# Patient Record
Sex: MALE | Race: WHITE | NOT HISPANIC OR LATINO | Employment: PART TIME | URBAN - METROPOLITAN AREA
[De-identification: names, ages, dates, MRNs, and addresses within clinical notes are randomized per-mention and may not be internally consistent; named-entity substitution may affect disease eponyms.]

---

## 2019-08-02 ENCOUNTER — HOSPITAL ENCOUNTER (EMERGENCY)
Facility: MEDICAL CENTER | Age: 24
End: 2019-08-03
Attending: EMERGENCY MEDICINE
Payer: COMMERCIAL

## 2019-08-02 DIAGNOSIS — K29.60 GASTRITIS DUE TO NONSTEROIDAL ANTI-INFLAMMATORY DRUG: ICD-10-CM

## 2019-08-02 DIAGNOSIS — E86.0 DEHYDRATION: ICD-10-CM

## 2019-08-02 DIAGNOSIS — T39.395A GASTRITIS DUE TO NONSTEROIDAL ANTI-INFLAMMATORY DRUG: ICD-10-CM

## 2019-08-02 DIAGNOSIS — T39.392A: ICD-10-CM

## 2019-08-02 DIAGNOSIS — R45.851 SUICIDAL IDEATION: ICD-10-CM

## 2019-08-02 LAB
ALBUMIN SERPL BCP-MCNC: 4.5 G/DL (ref 3.2–4.9)
ALBUMIN/GLOB SERPL: 1.6 G/DL
ALP SERPL-CCNC: 63 U/L (ref 30–99)
ALT SERPL-CCNC: 24 U/L (ref 2–50)
AMPHET UR QL SCN: NEGATIVE
ANION GAP SERPL CALC-SCNC: 12 MMOL/L (ref 0–11.9)
APAP SERPL-MCNC: <10 UG/ML (ref 10–30)
AST SERPL-CCNC: 18 U/L (ref 12–45)
BARBITURATES UR QL SCN: NEGATIVE
BASOPHILS # BLD AUTO: 0.4 % (ref 0–1.8)
BASOPHILS # BLD: 0.04 K/UL (ref 0–0.12)
BENZODIAZ UR QL SCN: NEGATIVE
BILIRUB SERPL-MCNC: 0.6 MG/DL (ref 0.1–1.5)
BUN SERPL-MCNC: 13 MG/DL (ref 8–22)
BZE UR QL SCN: NEGATIVE
CALCIUM SERPL-MCNC: 9.1 MG/DL (ref 8.5–10.5)
CANNABINOIDS UR QL SCN: POSITIVE
CHLORIDE SERPL-SCNC: 102 MMOL/L (ref 96–112)
CO2 SERPL-SCNC: 25 MMOL/L (ref 20–33)
CREAT SERPL-MCNC: 1.02 MG/DL (ref 0.5–1.4)
EKG IMPRESSION: NORMAL
EOSINOPHIL # BLD AUTO: 0.06 K/UL (ref 0–0.51)
EOSINOPHIL NFR BLD: 0.6 % (ref 0–6.9)
ERYTHROCYTE [DISTWIDTH] IN BLOOD BY AUTOMATED COUNT: 40.2 FL (ref 35.9–50)
ETHANOL BLD-MCNC: 0 G/DL
GLOBULIN SER CALC-MCNC: 2.8 G/DL (ref 1.9–3.5)
GLUCOSE SERPL-MCNC: 92 MG/DL (ref 65–99)
HCT VFR BLD AUTO: 49.5 % (ref 42–52)
HGB BLD-MCNC: 16.1 G/DL (ref 14–18)
IMM GRANULOCYTES # BLD AUTO: 0.03 K/UL (ref 0–0.11)
IMM GRANULOCYTES NFR BLD AUTO: 0.3 % (ref 0–0.9)
LIPASE SERPL-CCNC: 20 U/L (ref 11–82)
LYMPHOCYTES # BLD AUTO: 2.13 K/UL (ref 1–4.8)
LYMPHOCYTES NFR BLD: 23.1 % (ref 22–41)
MCH RBC QN AUTO: 27.3 PG (ref 27–33)
MCHC RBC AUTO-ENTMCNC: 32.5 G/DL (ref 33.7–35.3)
MCV RBC AUTO: 83.9 FL (ref 81.4–97.8)
METHADONE UR QL SCN: NEGATIVE
MONOCYTES # BLD AUTO: 0.54 K/UL (ref 0–0.85)
MONOCYTES NFR BLD AUTO: 5.8 % (ref 0–13.4)
NEUTROPHILS # BLD AUTO: 6.44 K/UL (ref 1.82–7.42)
NEUTROPHILS NFR BLD: 69.8 % (ref 44–72)
NRBC # BLD AUTO: 0 K/UL
NRBC BLD-RTO: 0 /100 WBC
OPIATES UR QL SCN: NEGATIVE
OXYCODONE UR QL SCN: NEGATIVE
PCP UR QL SCN: NEGATIVE
PLATELET # BLD AUTO: 274 K/UL (ref 164–446)
PMV BLD AUTO: 10.8 FL (ref 9–12.9)
POC BREATHALIZER: 0 PERCENT (ref 0–0.01)
POTASSIUM SERPL-SCNC: 3.5 MMOL/L (ref 3.6–5.5)
PROPOXYPH UR QL SCN: NEGATIVE
PROT SERPL-MCNC: 7.3 G/DL (ref 6–8.2)
RBC # BLD AUTO: 5.9 M/UL (ref 4.7–6.1)
SALICYLATES SERPL-MCNC: 0 MG/DL (ref 15–25)
SODIUM SERPL-SCNC: 139 MMOL/L (ref 135–145)
WBC # BLD AUTO: 9.2 K/UL (ref 4.8–10.8)

## 2019-08-02 PROCEDURE — 80307 DRUG TEST PRSMV CHEM ANLYZR: CPT

## 2019-08-02 PROCEDURE — 700105 HCHG RX REV CODE 258: Performed by: EMERGENCY MEDICINE

## 2019-08-02 PROCEDURE — 700111 HCHG RX REV CODE 636 W/ 250 OVERRIDE (IP): Performed by: EMERGENCY MEDICINE

## 2019-08-02 PROCEDURE — A9270 NON-COVERED ITEM OR SERVICE: HCPCS | Performed by: EMERGENCY MEDICINE

## 2019-08-02 PROCEDURE — 80053 COMPREHEN METABOLIC PANEL: CPT

## 2019-08-02 PROCEDURE — 85025 COMPLETE CBC W/AUTO DIFF WBC: CPT

## 2019-08-02 PROCEDURE — 83690 ASSAY OF LIPASE: CPT

## 2019-08-02 PROCEDURE — 99285 EMERGENCY DEPT VISIT HI MDM: CPT

## 2019-08-02 PROCEDURE — 96375 TX/PRO/DX INJ NEW DRUG ADDON: CPT

## 2019-08-02 PROCEDURE — 93005 ELECTROCARDIOGRAM TRACING: CPT | Performed by: EMERGENCY MEDICINE

## 2019-08-02 PROCEDURE — 96374 THER/PROPH/DIAG INJ IV PUSH: CPT

## 2019-08-02 PROCEDURE — 700102 HCHG RX REV CODE 250 W/ 637 OVERRIDE(OP): Performed by: EMERGENCY MEDICINE

## 2019-08-02 PROCEDURE — 302970 POC BREATHALIZER: Performed by: EMERGENCY MEDICINE

## 2019-08-02 RX ORDER — SODIUM CHLORIDE 9 MG/ML
1000 INJECTION, SOLUTION INTRAVENOUS ONCE
Status: COMPLETED | OUTPATIENT
Start: 2019-08-02 | End: 2019-08-02

## 2019-08-02 RX ORDER — SODIUM CHLORIDE 9 MG/ML
1000 INJECTION, SOLUTION INTRAVENOUS ONCE
Status: COMPLETED | OUTPATIENT
Start: 2019-08-02 | End: 2019-08-03

## 2019-08-02 RX ORDER — ONDANSETRON 2 MG/ML
4 INJECTION INTRAMUSCULAR; INTRAVENOUS ONCE
Status: COMPLETED | OUTPATIENT
Start: 2019-08-02 | End: 2019-08-02

## 2019-08-02 RX ADMIN — ACTIVATED CHARCOAL 25 G: 25 PELLET ORAL at 18:44

## 2019-08-02 RX ADMIN — ONDANSETRON 4 MG: 2 INJECTION INTRAMUSCULAR; INTRAVENOUS at 18:45

## 2019-08-02 RX ADMIN — FAMOTIDINE 20 MG: 10 INJECTION INTRAVENOUS at 18:44

## 2019-08-02 RX ADMIN — SODIUM CHLORIDE 1000 ML: 9 INJECTION, SOLUTION INTRAVENOUS at 18:52

## 2019-08-02 RX ADMIN — SODIUM CHLORIDE 1000 ML: 9 INJECTION, SOLUTION INTRAVENOUS at 20:15

## 2019-08-02 SDOH — HEALTH STABILITY: MENTAL HEALTH: HOW OFTEN DO YOU HAVE A DRINK CONTAINING ALCOHOL?: NEVER

## 2019-08-03 VITALS
SYSTOLIC BLOOD PRESSURE: 115 MMHG | HEIGHT: 74 IN | TEMPERATURE: 98.4 F | RESPIRATION RATE: 16 BRPM | BODY MASS INDEX: 29.31 KG/M2 | OXYGEN SATURATION: 96 % | WEIGHT: 228.4 LBS | HEART RATE: 62 BPM | DIASTOLIC BLOOD PRESSURE: 72 MMHG

## 2019-08-03 LAB
ANION GAP SERPL CALC-SCNC: 8 MMOL/L (ref 0–11.9)
BUN SERPL-MCNC: 11 MG/DL (ref 8–22)
CALCIUM SERPL-MCNC: 7.1 MG/DL (ref 8.5–10.5)
CHLORIDE SERPL-SCNC: 113 MMOL/L (ref 96–112)
CO2 SERPL-SCNC: 20 MMOL/L (ref 20–33)
CREAT SERPL-MCNC: 0.8 MG/DL (ref 0.5–1.4)
GLUCOSE SERPL-MCNC: 93 MG/DL (ref 65–99)
POTASSIUM SERPL-SCNC: 3.3 MMOL/L (ref 3.6–5.5)
SODIUM SERPL-SCNC: 141 MMOL/L (ref 135–145)

## 2019-08-03 PROCEDURE — 80048 BASIC METABOLIC PNL TOTAL CA: CPT

## 2019-08-03 PROCEDURE — 90791 PSYCH DIAGNOSTIC EVALUATION: CPT

## 2019-08-03 NOTE — ED NOTES
MD at bedside prior to d/c. Pt given d/c instruction and verbalized understanding. No rx's given. Pt ambulatory to lobby, gait steady. Pt took all belongings from room.

## 2019-08-03 NOTE — ED NOTES
Pt resting in bed, no complaints at this time. Will continue to monitor. Sitter in doorway for high risk 1:1

## 2019-08-03 NOTE — ED NOTES
Pt mother and father out in lobby asking to see pt. This RN greeted family in [facility], advised parents that visitors are not normally allowed to visit. Pt has been very cooperative and is ok with parents coming back to see him.     Parents brought back to room. Sitter remains at bedside 1;1.

## 2019-08-03 NOTE — CONSULTS
"[facility]   TRIAGE ASSESSMENT    Name: Tim Mike  MRN: [id number]  : 1995  Age: 24 y.o.  Date of assessment: 8/3/2019  PCP: Brant Mitchell M.D.  Persons in attendance: Patient, Biological Mother and Biological Father    CHIEF COMPLAINT/PRESENTING ISSUE (as stated by patient): 24 year old male BIB parents d/t pt ingesting approx 60 Ot Ibuprofen tabs last night, SI/SA, legal hold; pt received Charcoal in the ED; Salicylate and Acetaminophen levels WNL's; this AM, pt alert, oriented x 4; calm behaviors, tearful at times; pleasant; cooperative; organiexed thoughts and behaviors; insight, judgement intact; parents at bedside, drove from [place], [place]; pt states \"was home alone last night, overwhelmed by stressors including I failed [employer] accounting classes, ended a six year relationship Monday, I bought a bottle of Ibuprofen at [employer] and took the bottle,\" pt's parents arrived at the house and pt told his parents; pt states last night he was suicidal but currently denies SI, HI, or self-harm ideation;states \"wishing I had just talked to my parents about how I was feeling\" and \"I have value as a person\"; pt future-oriented, willing to accept MH help; pt states he talked to his parents re: a safe DC plan including taking next semester off from college, returning to live with his parents and siblings in [place], [place] where he has an increased support system; denies h/o SA or self-harm; does endorses feelings of increased depression, isolation for several months prior to last night's events;  denies h/o inpt MH or CD tx;  Denies current outpt MH providers, psych diagnosis, or psych meds; last visit with PCP/pediatrician, Dr. 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, finished his 4th year; pt also is working at [employer]; pt lives with college roomates in a house his parents own; pt identifies positive support systems as his mother, Heather, his father, Felipe, his 2 college roomates, family in [place], [place], and his Gnostic[employer] group; positive coping skills include playing baseball, disk golf, going to the gym  Pt's mother and father supportive of pt and  talked with pt and writer RN re: a safe DC plan for pt and feel comfortable with pt DC to return to [place], [place] with them      Chief Complaint   Patient presents with   • Suicidal Ideation   • Drug Overdose        CURRENT LIVING SITUATION/SOCIAL SUPPORT: pt lives with college roomates in a house his parents own; in [place];  identifies positive support systems as his mother, Heather, his father, Felipe, his 2 college roomates, family in [place], [place], and his Gnosticism group    BEHAVIORAL HEALTH TREATMENT HISTORY  Does patient/parent report a history of prior behavioral health treatment for patient?   No:    SAFETY ASSESSMENT - SELF  Does patient acknowledge current or past symptoms of dangerousness to self? no  Does parent/significant other report patient has current or past symptoms of dangerousness to self? no  Does presenting problem suggest symptoms of dangerousness to self? No    SAFETY ASSESSMENT - OTHERS  Does patient acknowledge current or past symptoms of aggressive behavior or risk to others? no  Does parent/significant other report patient has current or past symptoms of aggressive behavior or risk to others?  no  Does presenting problem suggest symptoms of dangerousness to others? No    Crisis Safety Plan completed and copy given to patient? yes    ABUSE/NEGLECT SCREENING  Does patient report feeling “unsafe” in his/her home, or afraid of anyone?  no  Does patient report any history of physical, sexual, or emotional abuse?  no  Does parent or significant other report any of the above? N\A  Is there evidence of  "neglect by self?  no  Is there evidence of neglect by a caregiver? no  Does the patient/parent report any history of CPS/APS/police involvement related to suspected abuse/neglect or domestic violence? no  Based on the information provided during the current assessment, is a mandated report of suspected abuse/neglect being made?  No    SUBSTANCE USE SCREENING  Yes:  Zeeshan all substances used in the past 30 days:      Last Use Amount   [x]   Alcohol 8/2/19 4 beers occassionally   [x]   Marijuana 7/28/19 Weekly use   []   Heroin     []   Prescription Opioids  (used without prescription, for    recreation, or in excess of prescribed amount)     []   Other Prescription  (used without prescription, for    recreation, or in excess of prescribed amount)     []   Cocaine      []   Methamphetamine     []   \"\" drugs (ectasy, MDMA)     []   Other substances        UDS results: + THC  Breathalyzer results: negative    What consequences does the patient associate with any of the above substance use and or addictive behaviors? None    Risk factors for detox (check all that apply):  []  Seizures   []  Diaphoretic (sweating)   []  Tremors   []  Hallucinations   []  Increased blood pressure   []  Decreased blood pressure   []  Other   [x]  None      [] Patient education on risk factors for detoxification and instructed to return to ER as needed.      MENTAL STATUS   Participation: Active verbal participation, Attentive, Engaged and Open to feedback  Grooming: Casual and Neat  Orientation: Alert  Behavior: Calm  Eye contact: Good  Mood: Euthymic  Affect: Flexible, Full range, Congruent with content and tearful at times  Thought process: Logical  Thought content: Within normal limits  Speech: Rate within normal limits and Volume within normal limits  Perception: Within normal limits  Memory:  No gross evidence of memory deficits  Insight: Good  Judgment:  Good  Other:    Collateral information:   Source:  [] Significant other " present in person:   [] Significant other by telephone  [] [facility]   [x] [facility]  [x] [facility]  [x] Other:  Pt's parents, Heather and Felipe    [] Unable to complete full assessment due to:  [] Acute intoxication  [] Patient declined to participate/engage  [] Patient verbally unresponsive  [] Significant cognitive deficits  [] Significant perceptual distortions or behavioral disorganization  [] Other:      CLINICAL IMPRESSIONS:  Primary:  Adjustment disorder with depressed mood  Secondary:         IDENTIFIED NEEDS/PLAN:  [Trigger DISPOSITION list for any items marked]    []  Imminent safety risk - self [] Imminent safety risk - others   []  Acute substance withdrawal []  Psychosis/Impaired reality testing   [x]  Mood/anxiety []  Substance use/Addictive behavior   [x]  Maladaptive behaviro []  Parent/child conflict   []  Family/Couples conflict []  Biomedical   []  Housing []  Financial   []   Legal  Occupational/Educational   []  Domestic violence []  Other:     Disposition: Refer to [facility], [facility] and [facility] in [place], [place]; [facility] insurance plans; writer RN reviewed Community  resources with pt in [place], [place] and [place], [place] and encouraged pt and family to call their insurance plan for a list of in-[facility] providers; pt and famiily verbalized understanding; pt to DC to self    Does patient and pt's parents express agreement with the above plan? yes    Referral appointment(s) scheduled? no    Alert team only:   I have discussed findings and recommendations with Dr. Abel who is in agreement with these recommendations. And will DC the legal hold    Referral information sent to the following community providers :NA    If applicable : Referred  to : for legal hold follow up at (time): CHUY Stewart R.N.  8/3/2019

## 2019-08-03 NOTE — DISCHARGE PLANNING
[facility]  Crisis/Safety Plan    Name:  Tim Mike  MRN:  [id number]  Date:  8/3/2019    Warning signs that a crisis may be developing for me or I may be at risk:  1) start isolating  2) trouble sleeping, stop sleeping  3) dark thoughts, don't ask for help    Coping strategies I can use on my own (relaxation, physical activity, etc):  1) deep breathing  2) exercise  3) start practicing meditation/yoga    Ways I can make my environment safe:  1) surround myself with positive people and faimly  2) take more responsibility for my weel-being  3) initiate and attend therapy    Things I want to tell myself when I feel a crisis developin) I have value as aperson  2) I can make it through this  3) there is not problem bigger than life    People I can contact for support or distraction (and their phone numbers):  1) parents [phone]  2) roommate [phone]  3) rlsajen-[phone]    If I’m not able to reach my support people, or the above strategies don’t help, I can contact the following professionals, agencies, or hotlines:  1) Crisis Call Center ():  [phone] -OR- [phone]  2) Crisis Text Line ():  Text CARE TO 477057  3) [facility] [phone]  4) [place] [phone]    Jocelyn Stewart R.N.

## 2019-08-03 NOTE — ED NOTES
Pt's parents updated on legal hold process, what will be happening tonight, and updated that behavioral health will see patient in morning

## 2019-08-03 NOTE — ED NOTES
"Pt reports attempt at self harm by taking 20 200mg tablets of Ibuprofen gotten from [employer] and another 20 tablets taken from \"larger jar\" at home. Pt reports having had \"a very bad week\" with a break up, self isolating, and pt had a shot of tequilla today.  "

## 2019-08-03 NOTE — ED PROVIDER NOTES
ED Provider Note    Scribed for Neha Escamilla MD by Jacqueline Carmen. 8/2/2019, 5:50 PM.    Primary care provider: None noted  Means of arrival: Walk-in  History obtained from: Patient  History limited by: None    CHIEF COMPLAINT  Chief Complaint   Patient presents with   • Suicidal Ideation   • Drug Overdose       HPI  Tim Mike is a 24 y.o. male who presents to the Emergency Department for evaluation of a drug overdose secondary to a suicide attempt onset four hours ago. The patient notes that he had a emotionally difficulty week so he took fifty of Ibuprofen 200 mg and twenty Ibuprofen 400 mg. He states that he additionally took a shot of Tequila earlier today before his overdose. He denies previous suicidal attempts or any cutting behavior. Endorses warm skin and abdominal pain. He descries his abdominal pain as burning in nature. No exacerbating or alleviating factors were reported for the patient's abdominal pain. Denies nausea, vomiting or diarrhea. Denies past surgical history. He denies taking any daily medications. He states that he smoked marijuana earlier this week. He denies a past medical history of diabetes.      REVIEW OF SYSTEMS  Pertinent positives include warm skin, abdominal pain. Pertinent negatives include no nausea, vomiting or diarrhea.  All other systems reviewed and negative.    PAST MEDICAL HISTORY  No past medical history was reported.    SURGICAL HISTORY  patient denies any surgical history    SOCIAL HISTORY  Social History     Tobacco Use   • Smoking status: Never Smoker   • Smokeless tobacco: Never Used   Substance Use Topics   • Alcohol use: Never     Frequency: Never   • Drug use: Never      Social History     Substance and Sexual Activity   Drug Use Never       FAMILY HISTORY  History reviewed. No pertinent family history.    CURRENT MEDICATIONS  The patient does not take any daily medications.     ALLERGIES  No Known Allergies    PHYSICAL EXAM  VITAL SIGNS: /79    "Pulse 96   Temp 37.3 °C (99.1 °F) (Temporal)   Resp 16   Ht 1.88 m (6' 2\")   Wt 103.6 kg (228 lb 6.3 oz)   SpO2 97%   BMI 29.32 kg/m²     General: Alert, No acute distress  Skin: Warm, dry, normal for ethnicity  Head: Normocephalic, atraumatic  Neck: Trachea midline, no tenderness  Eye: PERRL, normal conjunctiva  ENMT: Oral mucosa moist, no pharyngeal erythema or exudate  Cardiovascular: Regular rate and rhythm, No murmur, Normal peripheral perfusion  Respiratory: Lungs CTA, respirations are non-labored, breath sounds are equal  Gastrointestinal: Tenderness to the epigastrium without guarding or rigidity. Soft, non distended. Bowel sounds are normal and active.   Musculoskeletal: No swelling, no deformity  Neurological: Alert and oriented to person, place, time, and situation  Lymphatics: No lymphadenopathy  Psychiatric: Cooperative, appropriate mood & affect      DIAGNOSTIC STUDIES/PROCEDURES    LABS  Results for orders placed or performed during the hospital encounter of 08/02/19   Urine Drug Screen   Result Value Ref Range    Amphetamines Urine Negative Negative    Barbiturates Negative Negative    Benzodiazepines Negative Negative    Cocaine Metabolite Negative Negative    Methadone Negative Negative    Opiates Negative Negative    Oxycodone Negative Negative    Phencyclidine -Pcp Negative Negative    Propoxyphene Negative Negative    Cannabinoid Metab Positive (A) Negative   POC BREATHALIZER   Result Value Ref Range    POC Breathalizer 0.002 0.00 - 0.01 Percent       All labs reviewed by me.    EKG  12 Lead EKG obtained at 1800 and interpreted by me to show:  Rhythm: Normal sinus rhythm   Rate: 69  Axis: Normal  Intervals: Normal  Q Waves: Normal  No diagnostic ST segment elevation  QRS is 76, QTC is 412  Clinical Impression: Normal EKG  Compared to none available        COURSE & MEDICAL DECISION MAKING  Pertinent Labs & Imaging studies reviewed. (See chart for details)    5:50 PM - Patient seen and " examined at bedside. Patient will be treated with IV fluids 1,000 mL, Zofran 4 mg, Ez Luisa 25 g and Pepcid 20 mg. Ordered POC breathalizer, urine drug screen, salicylate level, acetaminophen, lipase, CMP, CBC with differential, blood alcohol, EKG to evaluate his symptoms. The differential diagnoses include but are not limited to: potential overdose, suicide attempt     2008: Spoke with poison control who concur concerning overdose, thankfully patient is below the 200 mg/kg toxic level that would be concerning for acidosis or seizures.  They recommend repeat chemistries at 6 hours from initial draw, observed in the emergency department for that time and further fluid resuscitation.  Have ordered a second liter of normal saline.    The total critical care time on this patient is 30 minutes, given overdose, fluid resuscitating patient, speaking with poison control, and deciphering test results. This 30 minutes is exclusive of separately billable procedures.    Decision Making:  This is a 24 y.o. year old male who presents with intentional overdose of NSAIDs.  Thankfully is well-appearing, minimal symptoms including mild nausea and epigastric burning sensation.  Mild epigastric tenderness but no surgical signs on exam.  EKG is unremarkable with no long QT and normal QRS, no evidence of any significant other coingestion, acetaminophen levels are not detected, tox screen is positive only for cannabis which the patient admits, last use about a week ago.  Given the significant NSAID OD spoke with poison control who note-the patient is below 200 mg/kg ingestion, less likely to cause acidosis and seizures.  Indeed there is no evidence of acidosis.  They recommend repeat chemistries and further hydration, chemistries to be drawn at 6 hours post arrival, at that point medically clear forcase number is [id number].    Patient medically cleared but on legal hold, awaiting input from life skills.  Dr. Gould will follow the repeat  CMP.    FINAL IMPRESSION  1. NSAID overdose, intentional self-harm, initial encounter ([facility])    2. Suicidal ideation    3. Dehydration    4. Gastritis due to nonsteroidal anti-inflammatory drug          IJacqueline (Scribe), am scribing for, and in the presence of, Neha Escamilla MD.    Electronically signed by: Jacqueline Carmen (Scribe), 8/2/2019    INeha MD personally performed the services described in this documentation, as scribed by Jacqueline Carmen in my presence, and it is both accurate and complete    C    The note accurately reflects work and decisions made by me.  Neha Escamilla  8/2/2019  8:41 PM

## 2019-08-03 NOTE — ED NOTES
Pt resting, visible chest rise and fall. Sitter at bedside in direct observation of patient.    None

## 2019-08-03 NOTE — ED NOTES
[facility] notified that pt has been moved from red pod. States he will be evaluated sometime this AM.

## 2019-08-03 NOTE — ED NOTES
Attempted to call life skills to make them aware of patient, [facility] states there is no coverage for night shift and patient will have to be seen in the morning. Charge RN notified

## 2019-08-03 NOTE — ED PROVIDER NOTES
ED PROVIDER NOTE    Scribed for Carly Gould M.D. by Chris Clifton. 8/2/2019, 8:00 PM.    This is an addendum to the note on Tim Mike. For further details and full chart entry, see the previously signed ED Provider Note written by Dr. Escamilla (ERP).      8:00 PM - I discussed the patient's case with Dr. Escamilla (ERP) who will transfer care of the patient to me at this time.  Briefly this is a 24-year-old male who comes in for suicide attempt with NSAID overdose.  Initial labs are reassuring.  He is signed out to myself to follow-up repeat metabolic panel in 6 hours and if creatinine is normal and patient is not acidotic he can be medically cleared for psychiatric evaluation.    Repeat metabolic panel returns and creatinine is within normal limits, no evidence of acidosis, anion gap is normal.  Therefore patient is medically cleared for evaluation by psych.  Plan will be for patient to be transferred to level 1 psychiatric facility when there is availability given his suicide attempt.  Patient and family are aware of this plan and agreeable.  Patient is awaiting transfer to level 1 psychiatric facility    FINAL IMPRESSION   Suicide attempt     I, Chris Clifton (Scribe), am scribing for, and in the presence of, Carly Gould M.D..    Electronically signed by: Chris Clifton (Kareenibe), 8/2/2019    Carly ROMERO M.D. personally performed the services described in this documentation, as scribed by Chris Clifton in my presence, and it is both accurate and complete.    The note accurately reflects work and decisions made by me.  Carly Gould  8/3/2019  4:40 AM

## 2019-08-03 NOTE — ED TRIAGE NOTES
Chief Complaint   Patient presents with   • Suicidal Ideation   • Drug Overdose       Patient ambulatory to triage with above complaint. Per patient he took 50 200 mg ibuprofen  and 20 400 mg ibuprofen 90 mintues ago. Patient states his family found him. Patient is A+Ox4. VSS.     Charge updated on patient. Patient to room 12. On monitor